# Patient Record
Sex: FEMALE | Race: WHITE | NOT HISPANIC OR LATINO | Employment: FULL TIME | URBAN - NONMETROPOLITAN AREA
[De-identification: names, ages, dates, MRNs, and addresses within clinical notes are randomized per-mention and may not be internally consistent; named-entity substitution may affect disease eponyms.]

---

## 2017-02-01 ENCOUNTER — OFFICE VISIT (OUTPATIENT)
Dept: OBSTETRICS AND GYNECOLOGY | Facility: CLINIC | Age: 22
End: 2017-02-01

## 2017-02-01 VITALS
WEIGHT: 168 LBS | DIASTOLIC BLOOD PRESSURE: 80 MMHG | SYSTOLIC BLOOD PRESSURE: 120 MMHG | HEIGHT: 66 IN | BODY MASS INDEX: 27 KG/M2

## 2017-02-01 DIAGNOSIS — Z30.017 ENCOUNTER FOR INITIAL PRESCRIPTION OF IMPLANTABLE SUBDERMAL CONTRACEPTIVE: Primary | ICD-10-CM

## 2017-02-01 PROCEDURE — 99202 OFFICE O/P NEW SF 15 MIN: CPT | Performed by: NURSE PRACTITIONER

## 2017-02-01 NOTE — PATIENT INSTRUCTIONS
Etonogestrel implant  What is this medicine?  ETONOGESTREL (et oh savita MARIANA trel) is a contraceptive (birth control) device. It is used to prevent pregnancy. It can be used for up to 3 years.  This medicine may be used for other purposes; ask your health care provider or pharmacist if you have questions.  What should I tell my health care provider before I take this medicine?  They need to know if you have any of these conditions:  -abnormal vaginal bleeding  -blood vessel disease or blood clots  -cancer of the breast, cervix, or liver  -depression  -diabetes  -gallbladder disease  -headaches  -heart disease or recent heart attack  -high blood pressure  -high cholesterol  -kidney disease  -liver disease  -renal disease  -seizures  -tobacco smoker  -an unusual or allergic reaction to etonogestrel, other hormones, anesthetics or antiseptics, medicines, foods, dyes, or preservatives  -pregnant or trying to get pregnant  -breast-feeding  How should I use this medicine?  This device is inserted just under the skin on the inner side of your upper arm by a health care professional.  Talk to your pediatrician regarding the use of this medicine in children. Special care may be needed.  Overdosage: If you think you have taken too much of this medicine contact a poison control center or emergency room at once.  NOTE: This medicine is only for you. Do not share this medicine with others.  What if I miss a dose?  This does not apply.  What may interact with this medicine?  Do not take this medicine with any of the following medications:  -amprenavir  -bosentan  -fosamprenavir  This medicine may also interact with the following medications:  -barbiturate medicines for inducing sleep or treating seizures  -certain medicines for fungal infections like ketoconazole and itraconazole  -griseofulvin  -medicines to treat seizures like carbamazepine, felbamate, oxcarbazepine, phenytoin,  topiramate  -modafinil  -phenylbutazone  -rifampin  -some medicines to treat HIV infection like atazanavir, indinavir, lopinavir, nelfinavir, tipranavir, ritonavir  -[place]'s wort  This list may not describe all possible interactions. Give your health care provider a list of all the medicines, herbs, non-prescription drugs, or dietary supplements you use. Also tell them if you smoke, drink alcohol, or use illegal drugs. Some items may interact with your medicine.  What should I watch for while using this medicine?  This product does not protect you against HIV infection (AIDS) or other sexually transmitted diseases.  You should be able to feel the implant by pressing your fingertips over the skin where it was inserted. Contact your doctor if you cannot feel the implant, and use a non-hormonal birth control method (such as condoms) until your doctor confirms that the implant is in place. If you feel that the implant may have broken or become bent while in your arm, contact your healthcare provider.  What side effects may I notice from receiving this medicine?  Side effects that you should report to your doctor or health care professional as soon as possible:  -allergic reactions like skin rash, itching or hives, swelling of the face, lips, or tongue  -breast lumps  -changes in emotions or moods  -depressed mood  -heavy or prolonged menstrual bleeding  -pain, irritation, swelling, or bruising at the insertion site  -scar at site of insertion  -signs of infection at the insertion site such as fever, and skin redness, pain or discharge  -signs of pregnancy  -signs and symptoms of a blood clot such as breathing problems; changes in vision; chest pain; severe, sudden headache; pain, swelling, warmth in the leg; trouble speaking; sudden numbness or weakness of the face, arm or leg  -signs and symptoms of liver injury like dark yellow or brown urine; general ill feeling or flu-like symptoms; light-colored stools; loss of  appetite; nausea; right upper belly pain; unusually weak or tired; yellowing of the eyes or skin  -unusual vaginal bleeding, discharge  -signs and symptoms of a stroke like changes in vision; confusion; trouble speaking or understanding; severe headaches; sudden numbness or weakness of the face, arm or leg; trouble walking; dizziness; loss of balance or coordination  Side effects that usually do not require medical attention (Report these to your doctor or health care professional if they continue or are bothersome.):  -acne  -back pain  -breast pain  -changes in weight  -dizziness  -general ill feeling or flu-like symptoms  -headache  -irregular menstrual bleeding  -nausea  -sore throat  -vaginal irritation or inflammation  This list may not describe all possible side effects. Call your doctor for medical advice about side effects. You may report side effects to FDA at 1-472-FDA-4225.  Where should I keep my medicine?  This drug is given in a hospital or clinic and will not be stored at home.  NOTE: This sheet is a summary. It may not cover all possible information. If you have questions about this medicine, talk to your doctor, pharmacist, or health care provider.     © 2016, Elsevier/Gold Standard. (2015-10-02 14:07:06)

## 2017-02-17 ENCOUNTER — OFFICE VISIT (OUTPATIENT)
Dept: OBSTETRICS AND GYNECOLOGY | Facility: CLINIC | Age: 22
End: 2017-02-17

## 2017-02-17 VITALS
BODY MASS INDEX: 27.64 KG/M2 | WEIGHT: 172 LBS | DIASTOLIC BLOOD PRESSURE: 90 MMHG | HEIGHT: 66 IN | SYSTOLIC BLOOD PRESSURE: 120 MMHG

## 2017-02-17 DIAGNOSIS — Z30.017 NEXPLANON INSERTION: Primary | ICD-10-CM

## 2017-02-17 LAB
B-HCG UR QL: NEGATIVE
INTERNAL NEGATIVE CONTROL: NORMAL
INTERNAL POSITIVE CONTROL: NORMAL
Lab: NORMAL

## 2017-02-17 PROCEDURE — 11981 INSERTION DRUG DLVR IMPLANT: CPT | Performed by: NURSE PRACTITIONER

## 2017-02-17 PROCEDURE — 81025 URINE PREGNANCY TEST: CPT | Performed by: NURSE PRACTITIONER

## 2017-02-17 PROCEDURE — 99212 OFFICE O/P EST SF 10 MIN: CPT | Performed by: NURSE PRACTITIONER

## 2017-02-17 NOTE — PROGRESS NOTES
Subjective   Robina Akins is a 21 y.o. female is here today as a self referral.    HPI Comments: I'm here to get my nexplanon inserted I am 6 weeks pp and breastfeeding    Contraception   Pertinent negatives include no abdominal pain, arthralgias, chest pain, chills, congestion, coughing, diaphoresis, fatigue, fever, headaches, joint swelling, myalgias, nausea, numbness, rash, sore throat, vomiting or weakness.       The following portions of the patient's history were reviewed and updated as appropriate: allergies, current medications, past medical history, past social history, past surgical history and problem list.    Review of Systems   Constitutional: Negative for activity change, appetite change, chills, diaphoresis, fatigue, fever and unexpected weight change.   HENT: Negative for congestion, ear discharge, ear pain, facial swelling, hearing loss, mouth sores, nosebleeds, postnasal drip, rhinorrhea, sinus pressure, sneezing, sore throat, tinnitus, trouble swallowing and voice change.    Eyes: Negative for photophobia, pain, discharge, redness, itching and visual disturbance.   Respiratory: Negative for apnea, cough, choking, chest tightness and shortness of breath.    Cardiovascular: Negative for chest pain, palpitations and leg swelling.   Gastrointestinal: Negative for abdominal distention, abdominal pain, anal bleeding, blood in stool, constipation, diarrhea, nausea, rectal pain and vomiting.   Endocrine: Negative for cold intolerance and heat intolerance.   Genitourinary: Negative for decreased urine volume, difficulty urinating, dyspareunia, flank pain, frequency, genital sores, hematuria, menstrual problem, pelvic pain, urgency, vaginal bleeding, vaginal discharge and vaginal pain.   Musculoskeletal: Negative for arthralgias, back pain, joint swelling and myalgias.   Skin: Negative for color change and rash.   Allergic/Immunologic: Negative for environmental allergies.   Neurological: Negative for  dizziness, syncope, weakness, numbness and headaches.   Hematological: Negative for adenopathy.   Psychiatric/Behavioral: Negative for agitation, confusion and sleep disturbance. The patient is not nervous/anxious.        Objective   Physical Exam   Constitutional: She is oriented to person, place, and time. She appears well-developed and well-nourished.   Eyes: Pupils are equal, round, and reactive to light.   Neck: No thyromegaly present.   Cardiovascular: Normal rate and regular rhythm.    Pulmonary/Chest: Effort normal and breath sounds normal.   Musculoskeletal: Normal range of motion.   Nexplanon inserted into Left arm   Neurological: She is alert and oriented to person, place, and time.   Skin: Skin is warm and dry.   Psychiatric: She has a normal mood and affect. Her behavior is normal. Judgment and thought content normal.   Nursing note and vitals reviewed.  Subdermal Contraceptive Implant Insertion Note    Robina Akins desires a subdermal etonogestrel contraceptive implant insertion.  She has been counseled regarding the risks, benefits and alternatives to the implant.  She especially understands that her menstrual periods are expected to become irregular and unpredictable throughout the time she is using the implant.  She has no contraindications to the insertion.  Her questions have been answered.  She has fully reviewed the FDA-approved consent brochure, has signed the consent form, and wishes to proceed with the insertion today.     Current method of contraception:  no method    No LMP recorded (lmp unknown).    Urine pregnancy test: neg      Procedure Time Out Documentation       Procedure Details  The inner side of the left arm was cleaned with Hibiclens-Alcoholx3 and infiltrated with 2% lidocaine.  The contraceptive janes was inserted according to the 's instructions without complications.  The janes was palpable under the skin after the insertion.  The insertion site was closed with Band-Aid  and a pressure dressing was applied.    Lot:  [id number]  Exp:  5-19      Robina was given post-insertion instructions.  She understands that the implant must be removed at the end of three years and may be removed sooner if she wishes.    Successful insertion of implanon device.        Assessment/Plan   Robina was seen today for contraception.    Diagnoses and all orders for this visit:    Nexplanon insertion    This is patient's 3rd Nexplanon. She is 6 weeks pp and breastfeeding. No problems with insertion  -     POC Pregnancy, Urine

## 2017-02-17 NOTE — PATIENT INSTRUCTIONS
Etonogestrel implant  What is this medicine?  ETONOGESTREL (et oh savita MARIANA trel) is a contraceptive (birth control) device. It is used to prevent pregnancy. It can be used for up to 3 years.  This medicine may be used for other purposes; ask your health care provider or pharmacist if you have questions.  COMMON BRAND NAME(S): Implanon, Nexplanon  What should I tell my health care provider before I take this medicine?  They need to know if you have any of these conditions:  -abnormal vaginal bleeding  -blood vessel disease or blood clots  -cancer of the breast, cervix, or liver  -depression  -diabetes  -gallbladder disease  -headaches  -heart disease or recent heart attack  -high blood pressure  -high cholesterol  -kidney disease  -liver disease  -renal disease  -seizures  -tobacco smoker  -an unusual or allergic reaction to etonogestrel, other hormones, anesthetics or antiseptics, medicines, foods, dyes, or preservatives  -pregnant or trying to get pregnant  -breast-feeding  How should I use this medicine?  This device is inserted just under the skin on the inner side of your upper arm by a health care professional.  Talk to your pediatrician regarding the use of this medicine in children. Special care may be needed.  Overdosage: If you think you have taken too much of this medicine contact a poison control center or emergency room at once.  NOTE: This medicine is only for you. Do not share this medicine with others.  What if I miss a dose?  This does not apply.  What may interact with this medicine?  Do not take this medicine with any of the following medications:  -amprenavir  -bosentan  -fosamprenavir  This medicine may also interact with the following medications:  -barbiturate medicines for inducing sleep or treating seizures  -certain medicines for fungal infections like ketoconazole and itraconazole  -griseofulvin  -medicines to treat seizures like carbamazepine, felbamate, oxcarbazepine, phenytoin,  topiramate  -modafinil  -phenylbutazone  -rifampin  -some medicines to treat HIV infection like atazanavir, indinavir, lopinavir, nelfinavir, tipranavir, ritonavir  -[place]'s wort  This list may not describe all possible interactions. Give your health care provider a list of all the medicines, herbs, non-prescription drugs, or dietary supplements you use. Also tell them if you smoke, drink alcohol, or use illegal drugs. Some items may interact with your medicine.  What should I watch for while using this medicine?  This product does not protect you against HIV infection (AIDS) or other sexually transmitted diseases.  You should be able to feel the implant by pressing your fingertips over the skin where it was inserted. Contact your doctor if you cannot feel the implant, and use a non-hormonal birth control method (such as condoms) until your doctor confirms that the implant is in place. If you feel that the implant may have broken or become bent while in your arm, contact your healthcare provider.  What side effects may I notice from receiving this medicine?  Side effects that you should report to your doctor or health care professional as soon as possible:  -allergic reactions like skin rash, itching or hives, swelling of the face, lips, or tongue  -breast lumps  -changes in emotions or moods  -depressed mood  -heavy or prolonged menstrual bleeding  -pain, irritation, swelling, or bruising at the insertion site  -scar at site of insertion  -signs of infection at the insertion site such as fever, and skin redness, pain or discharge  -signs of pregnancy  -signs and symptoms of a blood clot such as breathing problems; changes in vision; chest pain; severe, sudden headache; pain, swelling, warmth in the leg; trouble speaking; sudden numbness or weakness of the face, arm or leg  -signs and symptoms of liver injury like dark yellow or brown urine; general ill feeling or flu-like symptoms; light-colored stools; loss of  appetite; nausea; right upper belly pain; unusually weak or tired; yellowing of the eyes or skin  -unusual vaginal bleeding, discharge  -signs and symptoms of a stroke like changes in vision; confusion; trouble speaking or understanding; severe headaches; sudden numbness or weakness of the face, arm or leg; trouble walking; dizziness; loss of balance or coordination  Side effects that usually do not require medical attention (Report these to your doctor or health care professional if they continue or are bothersome.):  -acne  -back pain  -breast pain  -changes in weight  -dizziness  -general ill feeling or flu-like symptoms  -headache  -irregular menstrual bleeding  -nausea  -sore throat  -vaginal irritation or inflammation  This list may not describe all possible side effects. Call your doctor for medical advice about side effects. You may report side effects to FDA at 7-398-FDA-4684.  Where should I keep my medicine?  This drug is given in a hospital or clinic and will not be stored at home.  NOTE: This sheet is a summary. It may not cover all possible information. If you have questions about this medicine, talk to your doctor, pharmacist, or health care provider.     © 2016, [employer]vier/Gold Standard. (2015-10-02 14:07:06)

## 2019-08-01 ENCOUNTER — OFFICE VISIT (OUTPATIENT)
Dept: OBSTETRICS AND GYNECOLOGY | Facility: CLINIC | Age: 24
End: 2019-08-01

## 2019-08-01 VITALS
HEIGHT: 67 IN | BODY MASS INDEX: 22.91 KG/M2 | WEIGHT: 146 LBS | SYSTOLIC BLOOD PRESSURE: 122 MMHG | DIASTOLIC BLOOD PRESSURE: 70 MMHG

## 2019-08-01 DIAGNOSIS — Z30.017 ENCOUNTER FOR INITIAL PRESCRIPTION OF NEXPLANON: Primary | ICD-10-CM

## 2019-08-01 PROCEDURE — 99213 OFFICE O/P EST LOW 20 MIN: CPT | Performed by: OBSTETRICS & GYNECOLOGY

## 2019-08-01 NOTE — PROGRESS NOTES
Subjective   Robina Akins is a 23 y.o. female  YOB: 1995    Chief Complaint   Patient presents with   • Contraception     Pt is here to have her nexplanon removed.  PT is also wanting to discuss other birth control options.         24 yo  Patient's last menstrual period was 2019 (approximate) presents for contraceptive management. Patient currently has Nexplanon in place at this time. Reports that over the past couple of months she has had irregular bleeding. Her Nexplanon was placed in 2017. She reports for approximately 1.5 years she had no period. Her last PAP smear was 3 years ago and was negative as per patient. She reports that she is sexually active with one partner at this time. Denies smoking drinking or drug use.               No Known Allergies    History reviewed. No pertinent past medical history.    Family History   Problem Relation Age of Onset   • Breast cancer Maternal Grandmother    • Colon cancer Maternal Aunt        Social History     Socioeconomic History   • Marital status: Single     Spouse name: Not on file   • Number of children: Not on file   • Years of education: Not on file   • Highest education level: Not on file   Tobacco Use   • Smoking status: Former Smoker     Last attempt to quit: 2012     Years since quittin.5   • Smokeless tobacco: Never Used   Substance and Sexual Activity   • Alcohol use: No   • Drug use: No   • Sexual activity: Yes     Partners: Male     Birth control/protection: Implant     Comment: csp for 8 years         Current Outpatient Medications:   •  Etonogestrel (NEXPLANON) 68 MG implant subdermal implant, Inject 1 each into the appropriate area of the skin as directed by provider 1 (One) Time., Disp: , Rfl:   •  Prenatal Vit-Fe Fumarate-FA (PRENATAL VITAMIN PO), Take  by mouth., Disp: , Rfl:     Patient's last menstrual period was 2019 (approximate).    Sexual History:         Could not be calculated    Past Surgical  "History:   Procedure Laterality Date   • ADENOIDECTOMY     • DILATATION AND CURETTAGE     • EAR TUBES     • WRIST SURGERY Right        Review of Systems   Genitourinary: Positive for menstrual problem and vaginal bleeding. Negative for vaginal discharge.       Objective   Physical Exam   Constitutional: She is oriented to person, place, and time. She appears well-developed and well-nourished.   HENT:   Head: Normocephalic and atraumatic.   Neck: Normal range of motion.   Pulmonary/Chest: Effort normal.   Musculoskeletal: Normal range of motion.   Neurological: She is alert and oriented to person, place, and time.   Skin: Skin is warm and dry.   Nexplanon palpable in left upper arm.    Psychiatric: She has a normal mood and affect. Her behavior is normal. Judgment normal.   Nursing note and vitals reviewed.    Vitals:    08/01/19 1329   BP: 122/70   Weight: 66.2 kg (146 lb)   Height: 170.2 cm (67\")       Robina was seen today for contraception.    Diagnoses and all orders for this visit:    Encounter for initial prescription of Nexplanon    -Patient to return to office for annual examination as well as Nexplanon removal and insertion.   -Discussed different management options at this time for contraception. Reports that she is interested in another Nexplanon   -Patient needs PAP smear at next visit   -Counseled patient about safe sexual practices.     Celsa Berg, DO       "

## 2019-08-27 ENCOUNTER — TELEPHONE (OUTPATIENT)
Dept: OBSTETRICS AND GYNECOLOGY | Facility: CLINIC | Age: 24
End: 2019-08-27

## 2023-03-07 ENCOUNTER — TELEPHONE (OUTPATIENT)
Dept: OTOLARYNGOLOGY | Facility: CLINIC | Age: 28
End: 2023-03-07
Payer: MEDICAID

## 2023-03-07 NOTE — TELEPHONE ENCOUNTER
Call placed to patient as March appointment must be rescheduled, message left informing of new appointment on 4/242023 at 110, appointment reminder mailed as well

## 2023-04-24 ENCOUNTER — OFFICE VISIT (OUTPATIENT)
Dept: OTOLARYNGOLOGY | Facility: CLINIC | Age: 28
End: 2023-04-24
Payer: MEDICAID

## 2023-04-24 VITALS
WEIGHT: 177.8 LBS | DIASTOLIC BLOOD PRESSURE: 78 MMHG | TEMPERATURE: 97.5 F | BODY MASS INDEX: 30.35 KG/M2 | SYSTOLIC BLOOD PRESSURE: 131 MMHG | HEART RATE: 71 BPM | HEIGHT: 64 IN

## 2023-04-24 DIAGNOSIS — J35.03 TONSILLITIS AND ADENOIDITIS, CHRONIC: Primary | ICD-10-CM

## 2023-04-24 PROBLEM — E55.9 VITAMIN D DEFICIENCY: Status: ACTIVE | Noted: 2020-06-26

## 2023-04-24 PROCEDURE — 99204 OFFICE O/P NEW MOD 45 MIN: CPT | Performed by: EMERGENCY MEDICINE

## 2023-04-24 PROCEDURE — 1159F MED LIST DOCD IN RCRD: CPT | Performed by: EMERGENCY MEDICINE

## 2023-04-24 PROCEDURE — 1160F RVW MEDS BY RX/DR IN RCRD: CPT | Performed by: EMERGENCY MEDICINE

## 2023-04-24 RX ORDER — MULTIPLE VITAMINS W/ MINERALS TAB 9MG-400MCG
1 TAB ORAL DAILY
COMMUNITY

## 2023-04-24 NOTE — PROGRESS NOTES
CHARLY Mendenhall  [facility]  [address], [address]  [place] [address]  Fax [phone]  Phone [phone]      Visit Type: NEW PATIENT   Chief Complaint   Patient presents with   • Sore Throat     Recurrent strep, tonsillitis        HPI  She complains of tonsil problems, sore throats, frequent tonsillitis and large tonsils. The symptoms are not localized to a particular location. The patient has had mild to moderate symptoms. The symptoms have been relatively constant for the last several years. There have been no identified factors that aggravate the symptoms. The patient has been treated with Cefdinir in the past with continued symptoms.  She reports seeing an ENT in [place] several years ago.  She is a former smoker.  She reports near daily sore throats.  She states her tonsils and throat have been swollen and enlarged for years.     History reviewed. No pertinent past medical history.    Past Surgical History:   Procedure Laterality Date   • ADENOIDECTOMY     • DILATATION AND CURETTAGE     • EAR TUBES     • WRIST SURGERY Right        Family History: Her family history includes Breast cancer in her maternal grandmother; Colon cancer in her maternal aunt.     Social History: She  reports that she quit smoking about 11 years ago. Her smoking use included cigarettes. She has never used smokeless tobacco. She reports that she does not drink alcohol and does not use drugs.    Home Medications:  multivitamin with minerals    Allergies:  She has No Known Allergies.       Vital Signs:   Temp:  [97.5 °F (36.4 °C)] 97.5 °F (36.4 °C)  Heart Rate:  [71] 71  BP: (131)/(78) 131/78  ENT Physical Exam  Constitutional  Appearance: patient appears well-developed, well-nourished and well-groomed,  Communication/Voice: communication appropriate for developmental age; vocal quality normal;  Head and Face  Appearance: head appears normal, face  appears normal and face appears atraumatic;  Palpation: facial palpation normal;  Salivary: glands normal;  Ear  Hearing: intact;  Auricles: right auricle normal; left auricle normal;  External Mastoids: right external mastoid normal; left external mastoid normal;  Ear Canals: right ear canal normal; left ear canal normal;  Tympanic Membranes: left tympanic membrane abnormal; with myringosclerosis;  Nose  Internal Nose: bilateral intranasal mucosa edematous and erythematous;  Oral Cavity/Oropharynx  Lips: normal;  Teeth: normal;  Gums: gingiva normal;  Tongue: normal;  Oral mucosa: normal;  Hard palate: normal;  Tonsils: bilateral tonsils 1+, cryptic;  Neck  Neck: neck normal; neck palpation normal;  Respiratory  Inspection: breathing unlabored;  Cardiovascular  Inspection: extremities are warm and well perfused;  Lymphatic  Palpation: lymph nodes normal;         Result Review    RESULTS REVIEW    I have reviewed the patients old records in the chart.     Assessment & Plan    Diagnoses and all orders for this visit:    1. Tonsillitis and adenoiditis, chronic (Primary)           No follow-ups on file.      CHARLY Mendenhall   04/24/23  11:10 CDT     Physician Attestation  I have seen and examined Robina Babin and have reviewed the notes, assessments, and/or procedures and I concur with this documentation.    Robina Babin is a 27 y.o. female presents for evaluation of tonsil problems. She has had a history of chronic tonsillar infammation (>3mo) unresponsive to medical managment.     OROPHARYNX: mucosa normal, tonsil fossa normal, 2+             ASSESSMENT:  1. Tonsillitis and adenoiditis, chronic         PLAN:  Medical and surgical options were discussed including observation versus surgical management. Risks, benefits and alternatives were discussed and questions were answered. After considering the options, it was decided that tonsillectomy andadenoidectomy was the best option.    INFORMED CONSENT  DISCUSSION:  TONSILLECTOMY AND ADENOIDECTOMY: A tonsillectomy and adenoidectomy were recommended. The risks and benefits were explained including but not limited to early and late bleeding, infection, risks of the general anesthesia, dysphagia and poor PO intake, and voice change/VPI.  Alternatives were discussed. The patient/parents understood these risks and wanted to proceed. Questions were asked appropriately answered.   .      PREOPERATIVE WORKUP:   Per anesthesia    Return for follow up postoperatively.        Electronically signed by Tyson Lerma MD, 04/24/23, 11:32 AM CDT.

## 2023-04-26 PROBLEM — J35.03 TONSILLITIS AND ADENOIDITIS, CHRONIC: Status: ACTIVE | Noted: 2023-04-26

## 2023-06-29 PROBLEM — Z90.89 S/P TONSILLECTOMY: Status: ACTIVE | Noted: 2023-06-29

## 2023-06-29 PROBLEM — J35.03 TONSILLITIS AND ADENOIDITIS, CHRONIC: Status: RESOLVED | Noted: 2023-04-26 | Resolved: 2023-06-29

## 2023-07-06 ENCOUNTER — TELEPHONE (OUTPATIENT)
Dept: OTOLARYNGOLOGY | Facility: CLINIC | Age: 28
End: 2023-07-06

## 2023-07-06 DIAGNOSIS — Z90.89 S/P TONSILLECTOMY: ICD-10-CM

## 2023-07-06 RX ORDER — OXYCODONE HCL 5 MG/5 ML
4 SOLUTION, ORAL ORAL EVERY 4 HOURS PRN
Qty: 90 ML | Refills: 0 | Status: CANCELLED | OUTPATIENT
Start: 2023-07-06

## 2023-07-06 NOTE — TELEPHONE ENCOUNTER
UNABLE TO WARM [place]    Provider: DR OLIVA  Caller: TRISH ALICIA  Relationship to Patient: SELF  Pharmacy: [facility]  Phone Number: [phone]  Reason for Call: PT HAD TONSILLECTOMY ON 6/29/23, PT HAS A FEVER THAT IS SLOWLY RISING DAY TO DAY,HER FACE IS SPLOTCHY, AND SHE HAS A BAD TASTE IN HER MOUTH.    PT HAS ALSO REQUESTED FOR PAIN MED TO BE CALLED INTO THE [facility], SHE IS NOT ABLE TO GET TO  PHARM TO  MED.    PLEASE CALL TO DISCUSS.

## 2024-05-30 ENCOUNTER — TELEPHONE (OUTPATIENT)
Dept: OTOLARYNGOLOGY | Facility: CLINIC | Age: 29
End: 2024-05-30
Payer: MEDICAID

## 2024-05-30 NOTE — TELEPHONE ENCOUNTER
pt called in complaining of swollen throat, and left ear pain scheduled with [facility] for tomorrow as a 1 yr follow from [facility] surgery.

## 2024-05-31 ENCOUNTER — OFFICE VISIT (OUTPATIENT)
Dept: OTOLARYNGOLOGY | Facility: CLINIC | Age: 29
End: 2024-05-31
Payer: MEDICAID

## 2024-05-31 VITALS — HEIGHT: 65 IN | BODY MASS INDEX: 29.32 KG/M2 | WEIGHT: 176 LBS | TEMPERATURE: 98.4 F

## 2024-05-31 DIAGNOSIS — H74.42 GRANULATION POLYP OF LEFT MIDDLE EAR: Primary | ICD-10-CM

## 2024-05-31 RX ORDER — CIPROFLOXACIN AND DEXAMETHASONE 3; 1 MG/ML; MG/ML
3 SUSPENSION/ DROPS AURICULAR (OTIC) 2 TIMES DAILY
Qty: 7.5 ML | Refills: 0 | Status: SHIPPED | OUTPATIENT
Start: 2024-05-31

## 2024-05-31 NOTE — PROGRESS NOTES
"    CHARLY Lopez  [facility]  [address], [address]  [place] [address]  Fax [phone]  Phone [phone]      Visit Type: FOLLOW UP   Chief Complaint   Patient presents with    Ear Fullness     L ear     Itching     L ear    Oral Swelling     \"Glands\" per pt           HPI  She presents for a follow up evaluation. She has had complaints of otalgia, ear fullness, ear pressure, and ear itching,  enlarged lymph nodes . The symptoms are localized to the left side. The symptoms severity was described as : moderate The symptoms have been : relatively constant for the last several weeks There have been no identified factors that aggravate the symptoms. There have been no factors that have improved the symptoms.      History reviewed. No pertinent past medical history.    Past Surgical History:   Procedure Laterality Date    ADENOIDECTOMY      DILATATION AND CURETTAGE      EAR TUBES      TONSILLECTOMY AND ADENOIDECTOMY Bilateral 6/29/2023    Procedure: tonsillectomy with coblation;  Surgeon: Tyson Lerma MD;  Location: [facility];  Service: ENT;  Laterality: Bilateral;    TYMPANOPLASTY Left     WRIST SURGERY Right        Family History: Her family history includes Breast cancer in her maternal grandmother; Colon cancer in her maternal aunt.     Social History: She  reports that she quit smoking about 12 years ago. Her smoking use included cigarettes. She has never used smokeless tobacco. She reports current alcohol use. She reports that she does not use drugs.    Home Medications:  ciprofloxacin-dexAMETHasone    Allergies:  She has No Known Allergies.       Vital Signs:   Temp:  [98.4 °F (36.9 °C)] 98.4 °F (36.9 °C)  ENT Physical Exam  Constitutional  Appearance: patient appears well-developed, well-nourished and well-groomed,  Communication/Voice: communication appropriate for developmental age; vocal quality " normal;  Head and Face  Appearance: head appears normal, face appears normal and face appears atraumatic;  Palpation: facial palpation normal;  Salivary: glands normal;  Ear  Hearing: intact;  Auricles: right auricle normal; left auricle normal;  External Mastoids: right external mastoid normal; left external mastoid normal;  Ear Canals: right ear canal normal; left ear canal normal;  Tympanic Membranes: left tympanic membrane abnormal; with granulation tissue;  Nose  External Nose: nares patent bilaterally; external nose normal;  Oral Cavity/Oropharynx  Lips: normal;  Teeth: normal;  Gums: gingiva normal;  Tongue: normal;  Oral mucosa: normal;  Hard palate: normal;  Tonsils: bilateral tonsils absent,  Neck  Neck: neck normal; neck palpation normal;  Respiratory  Inspection: breathing unlabored;  Cardiovascular  Inspection: extremities are warm and well perfused;  Lymphatic  Palpation: no cervical adenopathy noted;       Tympanometry    Date/Time: 5/31/2024 9:48 AM    Performed by: Marixa Hernandes APRN  Authorized by: Marixa Hernandes APRN  Consent: Verbal consent obtained.  Consent given by: patient  Patient tolerance: patient tolerated the procedure well with no immediate complications  Comments: Type A bilaterally         Result Review       RESULTS REVIEW    I have reviewed the patients old records in the chart.        Assessment & Plan  Granulation polyp of left middle ear       Orders Placed This Encounter   Procedures    Tympanometry      New Medications Ordered This Visit   Medications    ciprofloxacin-dexAMETHasone (CIPRODEX) 0.3-0.1 % otic suspension     Sig: Administer 3 drops into ear(s) as directed by provider 2 (Two) Times a Day.     Dispense:  7.5 mL     Refill:  0     Protect getting water in the ears. If needed, may use over the counter silicone plugs or a cotton ball coated with vasoline when bathing.  Use hairdryer on a cool setting after bathing.  For proper use of ear drops, push on  tragus (cartilage in front of ear canal) after drop placement.  Return in about 6 weeks (around 7/12/2024) for Follow up with CHARLY Lopez, with audiogram.        Electronically signed by CHARLY Lopez 05/31/24 9:48 AM CDT.

## 2024-06-03 ENCOUNTER — TELEPHONE (OUTPATIENT)
Dept: OTOLARYNGOLOGY | Facility: CLINIC | Age: 29
End: 2024-06-03
Payer: MEDICAID

## 2024-06-03 NOTE — TELEPHONE ENCOUNTER
----- Message from Odalis FRANKLIN sent at 6/3/2024  8:26 AM CDT -----  Regarding: FW: Neurological symptoms  Contact: [phone]    ----- Message -----  From: Robina Babin  Sent: 5/31/2024   7:54 PM CDT  To: [facility]  Subject: Neurological symptoms                            I am feeling confused and like I am going to fall asleep driving among other things. I have a spot in my vision on left side as well and have had stinky stuff coming out of this ear for years. I am worried that these symptoms could all be connected and would really love to have a CT scan to rule out anything serious. Can this be ordered through you guys?

## 2024-06-03 NOTE — TELEPHONE ENCOUNTER
Called pt and informed her that I had the provider look at her message. Informed her that, per APRN, she would need to be seen in the ER because the symptoms are not caused from a granulation polyp. Pt KIMBERLY.

## 2024-06-27 ENCOUNTER — TELEPHONE (OUTPATIENT)
Dept: OTOLARYNGOLOGY | Facility: CLINIC | Age: 29
End: 2024-06-27
Payer: MEDICAID

## 2024-06-27 NOTE — TELEPHONE ENCOUNTER
Called pt to r/s appt due to audiologist not here. Pt had already rescheduled her appt due to schedule conflict. Pt was r/s with same audiologist in October. Audiologist no longer works here so I rescheduled her to September with a different audiologist. Pt confirmed appt date/time.

## 2024-07-08 NOTE — PROGRESS NOTES
----- Message from Holly Ford sent at 7/8/2024  9:40 AM CDT -----  Regarding: Refill  MEDICATION REFILL REQUEST:     Call urgency: routine    Patient name: Nanci     Patient phone number: [phone]    Requested Medication: Adderall     Has Pt contacted Pharmacy : yes    Preferred Pharmacy:[facility]    Last completed appt date 05/15    Next appt date:  07/29    Holly Ford  7/8/2024, 9:41 AM   Subjective   Robina Akins is a 21 y.o. female is here today as a self referral.    HPI Comments: I'm here to see about getting the Nexplanon. I am 4 weeks pp and breastfeeding. I have had the Nexplanon before.    Contraception   Pertinent negatives include no abdominal pain, arthralgias, chest pain, chills, congestion, coughing, fatigue, headaches, myalgias, nausea, neck pain, numbness, rash, sore throat or vomiting.       The following portions of the patient's history were reviewed and updated as appropriate: allergies, current medications, past family history, past medical history, past surgical history and problem list.    Review of Systems   Constitutional: Negative for activity change, appetite change, chills and fatigue.   HENT: Negative for congestion, dental problem, ear pain, hearing loss, nosebleeds, rhinorrhea, sneezing, sore throat and voice change.    Eyes: Negative for discharge, redness, itching and visual disturbance.   Respiratory: Negative for apnea, cough, choking, shortness of breath and wheezing.    Cardiovascular: Negative for chest pain and palpitations.   Gastrointestinal: Negative for abdominal distention, abdominal pain, constipation, diarrhea, nausea and vomiting.   Endocrine: Negative for cold intolerance, heat intolerance and polyuria.   Genitourinary: Negative for difficulty urinating, dyspareunia, flank pain, genital sores, menstrual problem, pelvic pain, vaginal bleeding and vaginal discharge.   Musculoskeletal: Negative for arthralgias, back pain, myalgias and neck pain.   Skin: Negative for pallor and rash.   Allergic/Immunologic: Negative for environmental allergies and food allergies.   Neurological: Negative for dizziness, tremors, seizures, numbness and headaches.   Hematological: Negative for adenopathy. Does not bruise/bleed easily.   Psychiatric/Behavioral: Negative for agitation, decreased concentration, sleep disturbance and suicidal ideas. The patient is not  nervous/anxious.        Objective   Physical Exam   Constitutional: She is oriented to person, place, and time. She appears well-developed and well-nourished.   Eyes: Pupils are equal, round, and reactive to light.   Neck: No thyromegaly present.   Cardiovascular: Normal rate and regular rhythm.    Pulmonary/Chest: Effort normal and breath sounds normal.   Musculoskeletal: Normal range of motion.   Neurological: She is alert and oriented to person, place, and time.   Skin: Skin is warm and dry.   Psychiatric: She has a normal mood and affect. Her behavior is normal. Judgment and thought content normal.   Nursing note and vitals reviewed.        Assessment/Plan   Robina was seen today for contraception.    Diagnoses and all orders for this visit:    Encounter for initial prescription of implantable subdermal contraceptive    I am 4 weeks pp and want to get the Nexplanon. I am breastfeeding and know I can still do that and get the Nexplanon inserted. I have had  The Nexplanon 2 other times. I had a physical and pap while pregnant about 8 months ago.

## 2024-09-05 ENCOUNTER — TELEPHONE (OUTPATIENT)
Dept: OTOLARYNGOLOGY | Facility: CLINIC | Age: 29
End: 2024-09-05
Payer: MEDICAID

## 2024-09-09 ENCOUNTER — PROCEDURE VISIT (OUTPATIENT)
Dept: OTOLARYNGOLOGY | Facility: CLINIC | Age: 29
End: 2024-09-09
Payer: MEDICAID

## 2024-09-09 ENCOUNTER — OFFICE VISIT (OUTPATIENT)
Dept: OTOLARYNGOLOGY | Facility: CLINIC | Age: 29
End: 2024-09-09
Payer: MEDICAID

## 2024-09-09 VITALS
TEMPERATURE: 98.4 F | DIASTOLIC BLOOD PRESSURE: 82 MMHG | HEART RATE: 69 BPM | BODY MASS INDEX: 30.32 KG/M2 | SYSTOLIC BLOOD PRESSURE: 160 MMHG | HEIGHT: 65 IN | WEIGHT: 182 LBS

## 2024-09-09 DIAGNOSIS — Z01.10 HEARING WITHIN NORMAL LIMITS IN BOTH EARS: ICD-10-CM

## 2024-09-09 DIAGNOSIS — H74.42 GRANULATION POLYP OF LEFT MIDDLE EAR: Primary | ICD-10-CM

## 2024-09-09 DIAGNOSIS — Z01.10 HEARING WITHIN NORMAL LIMITS IN BOTH EARS: Primary | ICD-10-CM

## 2024-09-09 DIAGNOSIS — K21.9 LARYNGOPHARYNGEAL REFLUX (LPR): ICD-10-CM

## 2024-09-09 PROCEDURE — 99213 OFFICE O/P EST LOW 20 MIN: CPT | Performed by: NURSE PRACTITIONER

## 2024-09-09 PROCEDURE — 1159F MED LIST DOCD IN RCRD: CPT | Performed by: NURSE PRACTITIONER

## 2024-09-09 PROCEDURE — 92557 COMPREHENSIVE HEARING TEST: CPT

## 2024-09-09 PROCEDURE — 1160F RVW MEDS BY RX/DR IN RCRD: CPT | Performed by: NURSE PRACTITIONER

## 2024-09-09 PROCEDURE — 92567 TYMPANOMETRY: CPT

## 2024-09-09 RX ORDER — NICOTINE POLACRILEX 4 MG/1
20 GUM, CHEWING ORAL 2 TIMES DAILY
Qty: 60 EACH | Refills: 3 | Status: SHIPPED | OUTPATIENT
Start: 2024-09-09 | End: 2024-10-09

## 2024-09-09 NOTE — PROGRESS NOTES
AUDIOMETRIC EVALUATION      Name:  Robina Babin  :  1995  Age:  28 y.o.  Date of Evaluation:  2024       History:  Ms. Babin is seen today for a hearing evaluation due to polyp in left middle ear at the request of CHARLY Lopez.    Audiologic Information:  Concerns for Hearing: Left ear  PETs: Bilateral history  Other otologic surgical history: 2008-earl tympanoplasty  Aural Pressure/Fullness: No  Otalgia: No, but a deep itchy feeling in left ear  Otorrhea: No  Tinnitus: Occasional ringing  Dizziness: Occasional lightheaded/off balance feeling  Noise Exposure: No  Family history of hearing loss: No  Head trauma requiring hospital stay: No  Chemotherapy: No  Other significant history: No    **Case history obtained in office and through EMR system      EVALUATION:        RESULTS:    Otoscopic Evaluation:  Right: clear canal, tympanic membrane visualized  Left: clear canal, tympanic membrane visualized    Tympanometry (226 Hz):  Right: Type A  Left: Type A    Pure Tone Audiometry:    Right: Normal hearing thresholds   Left: Normal hearing thresholds     Speech Audiometry:   Right: Speech Reception Threshold (SRT) was obtained at 5 dB HL  Word Recognition scores - Excellent (100)% at 45 dB, using NU-6 List 1A with 10 words  Left: Speech Reception Threshold (SRT) was obtained at 5 dB HL  Word Recognition scores - Excellent (100)% at 45 dB, using NU-6 List 1A with 10 words  SRT/PTA in good agreement bilaterally.    IMPRESSIONS:  Tympanometry showed normal middle ear pressure and static compliance, consistent with normal middle ear function for both ears. Pure tone thresholds for both ears show normal, suggesting normal outer/middle ear function and normal cochlear/retrocochlear function. Patient was counseled with regard to the findings.      Diagnosis:  1. Hearing within normal limits in both ears         RECOMMENDATIONS/PLAN:  Follow-up recommendations per Donnell Taylor  APRN.  Practice good communication strategies to assist with everyday listening (eye contact with speakers, reduce background noise, encourage others to communicate clearly and slowly).  Consistent utilization of hearing protection devices in noisy environments.  Repeat hearing evaluation if changes in hearing are noted or concerns arise.  Discussed results and recommendations with patient. Questions were addressed and the patient was encouraged to contact our department should concerns arise.        Maritza Muhammad, CCC-A, F-AAA  Doctor of Audiology

## 2024-09-09 NOTE — PATIENT INSTRUCTIONS
Gastroesophageal Reflux Disease (Laryngopharyngeal Reflux), Adult  Gastroesophageal reflux disease (GERD) and/or Laryngopharyngeal Reflux, (LPR) happens when acid from your stomach flows up into the esophagus and/or throat and voicebox or larynx. When acid comes in contact with the these organs, the acid can cause soreness (inflammation). Over time, GERD may create small holes (ulcers) in the lining of the esophagus and may lead to the development of hoarseness, difficulty swallowing,   feeling of something stuck in the throat, increased mucous or drainage and even predispose to the development of malignancies, (cancer).    CAUSES   Increased body weight. This puts pressure on the stomach, making acid rise from the stomach into the esophagus.  Smoking. This increases acid production in the stomach.  Drinking alcohol. This causes decreased pressure in the lower esophageal sphincter (valve or ring of muscle between the esophagus and stomach), allowing acid from the stomach into the esophagus.  Late evening meals and a full stomach. This increases pressure and acid production in the stomach.  A malformed lower esophageal sphincter  Diet which can include avoidance of gluten and dairy products  Age  SYMPTOMS   Burning pain in the lower part of the mid-chest behind the breastbone and in the mid-stomach area. This may occur twice a week or more often.  Trouble swallowing.  Sore throat.  Dry cough.  Asthma-like symptoms including chest tightness, shortness of breath, or wheezing.  Globus sensation-something stuck in the throat/fullness  Hoarseness  DIAGNOSIS   Your caregiver may be able to diagnose GERD based on your symptoms. In some cases, X-rays and other tests may be done to check for complications or to check the condition of your stomach and esophagus.  You may need to see another doctor.  TREATMENT   Over-the-counter or prescription medicines to help decrease acid production.   Dietary and behavioral modifications  or changes may be also recommended.  HOME CARE INSTRUCTIONS   Change the factors that you can control. Ask your caregiver for guidance concerning weight loss, quitting smoking, and alcohol consumption.  Avoid foods and drinks that make your symptoms worse, and MAY include such as:  Caffeine or alcoholic drinks.  Chocolate.  Gluten containing foods  Dairy  Peppermint or mint flavorings.  Garlic and onions.  Spicy foods.  Citrus fruits, such as oranges, neva, or limes.  Tomato-based foods such as sauce, chili, salsa, and pizza.  Fried and fatty foods.  Avoid lying down for the 3 hours prior to your bedtime or prior to taking a nap.  Eat small, frequent meals instead of large meals.  Wear loose-fitting clothing. Do not wear anything tight around your waist that causes pressure on your stomach.  Raise the head of your bed 6 to 8 inches with wood blocks to help you sleep. Extra pillows will not help.  Only take over-the-counter or prescription medicines for pain, discomfort, or fever as directed by your caregiver.  Do not take aspirin, ibuprofen, or other nonsteroidal anti-inflammatory drugs if possible (NSAIDs).  SEEK IMMEDIATE MEDICAL CARE IF:   You have pain in your arms, neck, jaw, teeth, or back.  Your pain increases or changes in intensity or duration.  You develop nausea, vomiting, or sweating (diaphoresis).  You develop shortness of breath, or you faint.  Your vomit is green, yellow, black, or looks like coffee grounds or blood.  Your stool is red, bloody, or black.  These symptoms could be signs of other problems, such as heart disease, gastric bleeding, or esophageal bleeding.  MAKE SURE YOU:   Understand these instructions.  Will watch your condition.  Will get help right away if you are not doing well or get worse.     This information is not intended to replace advice given to you by your physician. Make sure you discuss any questions you have with your health care provider.     Modified by Renard Jiang,  MD, FACS 9/8/2016.  Document Released: 09/27/2006 Document Revised: 01/08/2016 Document Reviewed: 04/13/2016  [employer] Interactive Patient Education ©2016 [employer]

## 2024-09-09 NOTE — PROGRESS NOTES
YOB: 1995  Location: [facility]  Location Address: [address] [place] [address],  3, [address] [place] [address]  Location Phone: [phone]    Chief Complaint   Patient presents with    Granulation polyp left ear       History of Present Illness  Robina Babin is a 28 y.o. female.  Robina Babin is here for follow up of ENT complaints. The patient has had problems with manage in follow-up of the left ear.  She has completed eardrops.  She denies ear pain, ear drainage, and concerns for hearing.  Hearing test today is normal.  She does admit intermittent sore throat.  She does admit heartburn and indigestion.  She is not currently taking anything for acid reflux.    She has a history of tonsillectomy.    Reviewed:       AUDIOMETRIC EVALUATION        Name:  Robina Babin  :  1995  Age:  28 y.o.  Date of Evaluation:  2024         History:  Ms. Babin is seen today for a hearing evaluation due to polyp in left middle ear at the request of CHARLY Lopez.     Audiologic Information:  Concerns for Hearing: Left ear  PETs: Bilateral history  Other otologic surgical history: - tympanoplasty  Aural Pressure/Fullness: No  Otalgia: No, but a deep itchy feeling in left ear  Otorrhea: No  Tinnitus: Occasional ringing  Dizziness: Occasional lightheaded/off balance feeling  Noise Exposure: No  Family history of hearing loss: No  Head trauma requiring hospital stay: No  Chemotherapy: No  Other significant history: No     **Case history obtained in office and through EMR system        EVALUATION:          RESULTS:     Otoscopic Evaluation:  Right: clear canal, tympanic membrane visualized  Left: clear canal, tympanic membrane visualized     Tympanometry (226 Hz):  Right: Type A  Left: Type A     Pure Tone Audiometry:    Right: Normal hearing thresholds   Left: Normal hearing thresholds      Speech Audiometry:   Right: Speech Reception Threshold (SRT) was obtained at 5 dB  HL  Word Recognition scores - Excellent (100)% at 45 dB, using NU-6 List 1A with 10 words  Left: Speech Reception Threshold (SRT) was obtained at 5 dB HL  Word Recognition scores - Excellent (100)% at 45 dB, using NU-6 List 1A with 10 words  SRT/PTA in good agreement bilaterally.     IMPRESSIONS:  Tympanometry showed normal middle ear pressure and static compliance, consistent with normal middle ear function for both ears. Pure tone thresholds for both ears show normal, suggesting normal outer/middle ear function and normal cochlear/retrocochlear function. Patient was counseled with regard to the findings.        Diagnosis:  1. Hearing within normal limits in both ears          RECOMMENDATIONS/PLAN:  Follow-up recommendations per CHARLY Weeks.  Practice good communication strategies to assist with everyday listening (eye contact with speakers, reduce background noise, encourage others to communicate clearly and slowly).  Consistent utilization of hearing protection devices in noisy environments.  Repeat hearing evaluation if changes in hearing are noted or concerns arise.  Discussed results and recommendations with patient. Questions were addressed and the patient was encouraged to contact our department should concerns arise.           Maritza Muhammad, CCC-A, F-AAA  Doctor of Audiology         History reviewed. No pertinent past medical history.    Past Surgical History:   Procedure Laterality Date    ADENOIDECTOMY      DILATATION AND CURETTAGE      EAR TUBES      TONSILLECTOMY AND ADENOIDECTOMY Bilateral 6/29/2023    Procedure: tonsillectomy with coblation;  Surgeon: Tyson Lerma MD;  Location: [facility];  Service: ENT;  Laterality: Bilateral;    TYMPANOPLASTY Left     WRIST SURGERY Right        No outpatient medications have been marked as taking for the 9/9/24 encounter (Office Visit) with Donnell Taylor APRN.       Patient has no known allergies.    Family History   Problem Relation Age  of Onset    Breast cancer Maternal Grandmother     Colon cancer Maternal Aunt        Social History     Socioeconomic History    Marital status: Single   Tobacco Use    Smoking status: Former     Current packs/day: 0.00     Types: Cigarettes     Quit date: 2012     Years since quittin.6    Smokeless tobacco: Never   Vaping Use    Vaping status: Never Used   Substance and Sexual Activity    Alcohol use: Yes     Comment: occ    Drug use: No    Sexual activity: Yes     Partners: Male     Birth control/protection: Implant     Comment: csp for 8 years       Review of Systems   Constitutional: Negative.    HENT:  Positive for sore throat.    Respiratory: Negative.         Vitals:    24 1306   BP: 160/82   Pulse: 69   Temp: 98.4 °F (36.9 °C)       Body mass index is 30.29 kg/m².    Objective     Physical Exam  Vitals reviewed.   Constitutional:       Appearance: Normal appearance. She is obese.   HENT:      Head: Normocephalic.      Right Ear: Tympanic membrane, ear canal and external ear normal.      Left Ear: Tympanic membrane, ear canal and external ear normal.      Nose: Nose normal.      Mouth/Throat:      Lips: Pink.      Mouth: Mucous membranes are moist.      Tonsils: 0 on the right. 0 on the left.      Comments: Cobblestone appearance to posterior op  Neurological:      Mental Status: She is alert.   Psychiatric:         Behavior: Behavior is cooperative.         Assessment & Plan   Diagnoses and all orders for this visit:    1. Granulation polyp of left middle ear (Primary)  Comments:  resolved    2. Laryngopharyngeal reflux (LPR)    3. Hearing within normal limits in both ears    Other orders  -     Omeprazole 20 MG tablet delayed-release; Take 20 mg by mouth 2 (Two) Times a Day for 30 days.  Dispense: 60 each; Refill: 3      * Surgery not found *  No orders of the defined types were placed in this encounter.      Patient to call if she experiences any additional ear problems  Omeprazole before  breakfast and dinner  Reflux precautions    Return in about 6 months (around 3/9/2025) for Recheck.       Patient Instructions   Gastroesophageal Reflux Disease (Laryngopharyngeal Reflux), Adult  Gastroesophageal reflux disease (GERD) and/or Laryngopharyngeal Reflux, (LPR) happens when acid from your stomach flows up into the esophagus and/or throat and voicebox or larynx. When acid comes in contact with the these organs, the acid can cause soreness (inflammation). Over time, GERD may create small holes (ulcers) in the lining of the esophagus and may lead to the development of hoarseness, difficulty swallowing,   feeling of something stuck in the throat, increased mucous or drainage and even predispose to the development of malignancies, (cancer).    CAUSES   Increased body weight. This puts pressure on the stomach, making acid rise from the stomach into the esophagus.  Smoking. This increases acid production in the stomach.  Drinking alcohol. This causes decreased pressure in the lower esophageal sphincter (valve or ring of muscle between the esophagus and stomach), allowing acid from the stomach into the esophagus.  Late evening meals and a full stomach. This increases pressure and acid production in the stomach.  A malformed lower esophageal sphincter  Diet which can include avoidance of gluten and dairy products  Age  SYMPTOMS   Burning pain in the lower part of the mid-chest behind the breastbone and in the mid-stomach area. This may occur twice a week or more often.  Trouble swallowing.  Sore throat.  Dry cough.  Asthma-like symptoms including chest tightness, shortness of breath, or wheezing.  Globus sensation-something stuck in the throat/fullness  Hoarseness  DIAGNOSIS   Your caregiver may be able to diagnose GERD based on your symptoms. In some cases, X-rays and other tests may be done to check for complications or to check the condition of your stomach and esophagus.  You may need to see another  doctor.  TREATMENT   Over-the-counter or prescription medicines to help decrease acid production.   Dietary and behavioral modifications or changes may be also recommended.  HOME CARE INSTRUCTIONS   Change the factors that you can control. Ask your caregiver for guidance concerning weight loss, quitting smoking, and alcohol consumption.  Avoid foods and drinks that make your symptoms worse, and MAY include such as:  Caffeine or alcoholic drinks.  Chocolate.  Gluten containing foods  Dairy  Peppermint or mint flavorings.  Garlic and onions.  Spicy foods.  Citrus fruits, such as oranges, neva, or limes.  Tomato-based foods such as sauce, chili, salsa, and pizza.  Fried and fatty foods.  Avoid lying down for the 3 hours prior to your bedtime or prior to taking a nap.  Eat small, frequent meals instead of large meals.  Wear loose-fitting clothing. Do not wear anything tight around your waist that causes pressure on your stomach.  Raise the head of your bed 6 to 8 inches with wood blocks to help you sleep. Extra pillows will not help.  Only take over-the-counter or prescription medicines for pain, discomfort, or fever as directed by your caregiver.  Do not take aspirin, ibuprofen, or other nonsteroidal anti-inflammatory drugs if possible (NSAIDs).  SEEK IMMEDIATE MEDICAL CARE IF:   You have pain in your arms, neck, jaw, teeth, or back.  Your pain increases or changes in intensity or duration.  You develop nausea, vomiting, or sweating (diaphoresis).  You develop shortness of breath, or you faint.  Your vomit is green, yellow, black, or looks like coffee grounds or blood.  Your stool is red, bloody, or black.  These symptoms could be signs of other problems, such as heart disease, gastric bleeding, or esophageal bleeding.  MAKE SURE YOU:   Understand these instructions.  Will watch your condition.  Will get help right away if you are not doing well or get worse.     This information is not intended to replace advice  given to you by your physician. Make sure you discuss any questions you have with your health care provider.     Modified by Renard Jiang MD, FACS 9/8/2016.  Document Released: 09/27/2006 Document Revised: 01/08/2016 Document Reviewed: 04/13/2016  Elsevier Interactive Patient Education ©2016 Elsevier Inc[employer]

## 2024-09-13 ENCOUNTER — TELEPHONE (OUTPATIENT)
Dept: OTOLARYNGOLOGY | Facility: CLINIC | Age: 29
End: 2024-09-13

## 2024-09-13 NOTE — TELEPHONE ENCOUNTER
[facility] received a fax that requires your attention. The document has been indexed to the patient’s chart for your review.      Reason for sending: PLEASE REVIEW PRIOR AUTH REQUEST FOR OMEPRAZOLE 20MG DR    Documents Description: PRIOR AUTH [id number]    Name of Sender: [employer]    Date Indexed: 9.13.24    Notes (if needed):

## 2024-10-29 NOTE — PROGRESS NOTES
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  She indicates at age 13 she had had a severe case of strep.  She indicates she had some testing at that time that indicated MRSA.  She was prescribed some oral and nasal topical antibiotic.  She indicates she never really completed the full course of treatment.  She describes having some problems with recurrent strep throat the day back to childhood.  She describes some ongoing problems with what sounds like recurrent pharyngitis.  She indicates her tonsils had always looked inflamed and red.  She indicates she is to get some whitish pockets on her tonsils as well.  Could not tell if she is describing more exudate or pharyngitis appearance or whether there may have been some concretions.  In any event she underwent tonsillectomy.  Her tonsils were removed in June 2023.  Periodically she developed some left-sided neck soreness.  She indicates she can almost point toward an area in the anterior left neck area just below the mandibular angle where she feels the soreness.  She indicates the symptoms will last 4 to 5 days and then show some improvement.  She gets about 4-5 episodes per every 6 months.  She had had some recent testing that demonstrated MRSA.  She was prescribed a course of Bactrim and some intranasal mupirocin.  She does not describe lesions to suggest cold sores.  She has noticed a small little lymph node in the anterior to the right ear.  She indicates she saw ENT 6 to 8 weeks ago when she had had a flare of her left-sided throat soreness.  She indicates they thought the appearance of the mucosa was somewhat cobblestone like and she indicates they thought she  may have some issues with reflux.  I got the impression talking to her that she may have had some blood work in the past that may have demonstrated HSV.  She was referred for evaluation and recommendations out of concerns that maybe is a staph carrier that would be the cause of her current symptoms.  She had had previous tympanostomy tube placement on the left.  She indicates the past she had had a repair of what sounds like an area of left tympanic membrane perforation.  She does not describe other head neck surgery with the exception of the tonsillectomy as outlined above.    Allergies: No Known Allergies    Current Scheduled Medications:   Current Outpatient Medications on File Prior to Visit   Medication Sig    fluticasone (FLONASE) 50 MCG/ACT nasal spray 2 sprays by Alternating Nares route Daily.    multivitamin with minerals tablet tablet Take 1 tablet by mouth Daily.    omeprazole (priLOSEC) 20 MG capsule Take 1 capsule by mouth Every 12 (Twelve) Hours. (Patient taking differently: Take 1 capsule by mouth Every 12 (Twelve) Hours As Needed.)    mupirocin (BACTROBAN) 2 % ointment Apply 1 Application topically to the appropriate area as directed 2 (Two) Times a Day. For 10 days (Patient not taking: Reported on 10/30/2024)    [DISCONTINUED] ciprofloxacin-dexAMETHasone (CIPRODEX) 0.3-0.1 % otic suspension Administer 3 drops into ear(s) as directed by provider 2 (Two) Times a Day. (Patient not taking: Reported on 10/30/2024)     No current facility-administered medications on file prior to visit.     Venous Access Review  Line/IV site: No current IV Access    Antimicrobial Review  Currently on antibiotics/antifungals: YES/NO: NO  Start Date of Therapy: 09- - 10-: cefprozil 500 MG PO daily                                        10- - 10-: Bactrim DS  If therapy completed, date complete: 10-      Past Medical History:   Diagnosis Date    Anemia during pregnancy     Eustachian tube  "disorder, left     GERD (gastroesophageal reflux disease)     History of vitamin D deficiency      Past Medical History Pertinent Negatives:   Diagnosis Date Noted    Hard to intubate 2023    Malignant hyperthermia due to anesthesia 2023    PONV (postoperative nausea and vomiting) 2023    Spinal headache 2023     Past Surgical History:   Procedure Laterality Date    DILATATION AND CURETTAGE      EAR TUBES Bilateral     TONSILLECTOMY AND ADENOIDECTOMY Bilateral 2023    Procedure: tonsillectomy with coblation;  Surgeon: Tyson Lerma MD;  Location: [facility];  Service: ENT;  Laterality: Bilateral;    TYMPANOPLASTY Left 2006    WRIST SURGERY Right      Family History   Problem Relation Age of Onset    Breast cancer Maternal Grandmother     Colon cancer Maternal Aunt      Social History     Socioeconomic History    Marital status: Single   Tobacco Use    Smoking status: Former     Current packs/day: 0.00     Types: Cigarettes     Quit date: 2012     Years since quittin.7     Passive exposure: Never    Smokeless tobacco: Never   Vaping Use    Vaping status: Never Used   Substance and Sexual Activity    Alcohol use: Yes     Comment: occ    Drug use: Defer    Sexual activity: Defer     Exposure History: No close contacts have been ill    Review of Systems See HPI.    Vital Signs:  /88   Pulse 84   Temp 98.1 °F (36.7 °C) (Temporal)   Ht 162.6 cm (64\")   Wt 83.6 kg (184 lb 6.4 oz)   LMP 10/18/2024 (Approximate)   SpO2 98%   BMI 31.65 kg/m²     Physical Exam  Vital signs - reviewed.  Currently exam of her oropharynx appears favorable  There were no oral ulcerations, thrush, or exudate  Tonsils have been removed  I could not find any areas of ulceration or erythema  There was no cervical lymphadenopathy  No jaw tenderness  There was a small few millimeter lymph node in the left preauricular area.  There was no fluctuance.    Lab/Imaging/Other Information: "   PROGRESS NOTES - SCAN - DAYTON GIRON, NP - [facility] - 10/7/24 (10/07/2024)     PROGRESS NOTES - SCAN - DAVID SEAN, APRN - [facility] - 9/22/24 (09/22/2024)     LABORATORY - SCAN - RPP/MOLECULAR PATHOGEN REPORT, NASAL SWAB - [facility] - 9/22/24 (09/22/2024)     Impression & Recommendations:   Diagnoses and all orders for this visit:    1. Pharyngitis, unspecified etiology (Primary)  -     HSV 1 Antibody, IgG; Future  -     HSV 2 Antibody, IgG; Future    She has had some problems with what sounds like recurrent pharyngitis.  The symptoms that seem to be ongoing are some left-sided neck discomfort in the left upper neck area.  Symptoms will last 4 to 5 days and then resolved.  I get the sense she does pretty well in the intervening periods.  I explained when I hear problems with recurrent symptoms that last in the range of a week and then resolve with recurrence I consider the possibility of HSV infection.  I explained recurrent HSV-1 or HSV-2 could cause some difficulties with recurrent ulcerations or recurrent pharyngitis type symptoms.  At the present time I do not find any exam evidence of pharyngitis.  I talked with her about a couple of different approaches.  First we could consider doing nothing but wait for a recurrence to develop and then try to get her examined by myself and also by ENT at that time.  Explained ENT could do an NP scope and look deep into the posterior inferior oropharynx area and obtain samples if any evidence of abnormality.  Explained we could also do serology for HSV 1 and HSV 2.  If either were positive we could consider putting her on suppression with Valtrex 1 g daily or acyclovir 400 mg orally twice daily for 6 months.  Typically she has 3-4 recurrences in that timeframe.  If it was successful in eradicating recurrences, that might be somewhat of a diagnostic test to see if she responds to suppression.  I also explained we could proceed with the HSV 1 and HSV  testing but not place her on suppression and again reevaluate her when symptoms recur.  Felt like she had a good understanding of risks and benefits of each approach.  We decided to go ahead and proceed with HSV-1 and HSV-2 serology.  She will call early next week for results.  If either is positive I will likely treat her with a trial of she would be interested in a trial of suppression.  At that point will prescribe Valtrex 1 g orally daily-1 month with 2 refills.  Follow-up appointment in 2 months.  Would like to see her sooner if any recurrent symptoms in the interim.    Follow Up:     There are no Patient Instructions on file for this visit.  No follow-ups on file.  Patient was provided After Visit Summary.     Stevo Morales MD    CC:Shawanda Grant

## 2024-10-30 ENCOUNTER — LAB (OUTPATIENT)
Dept: LAB | Facility: HOSPITAL | Age: 29
End: 2024-10-30
Payer: MEDICAID

## 2024-10-30 ENCOUNTER — OFFICE VISIT (OUTPATIENT)
Age: 29
End: 2024-10-30
Payer: MEDICAID

## 2024-10-30 VITALS
HEART RATE: 84 BPM | WEIGHT: 184.4 LBS | BODY MASS INDEX: 31.48 KG/M2 | TEMPERATURE: 98.1 F | OXYGEN SATURATION: 98 % | SYSTOLIC BLOOD PRESSURE: 125 MMHG | HEIGHT: 64 IN | DIASTOLIC BLOOD PRESSURE: 88 MMHG

## 2024-10-30 DIAGNOSIS — J02.9 PHARYNGITIS, UNSPECIFIED ETIOLOGY: Primary | ICD-10-CM

## 2024-10-30 DIAGNOSIS — J02.9 PHARYNGITIS, UNSPECIFIED ETIOLOGY: ICD-10-CM

## 2024-10-30 PROCEDURE — 36415 COLL VENOUS BLD VENIPUNCTURE: CPT

## 2024-10-30 PROCEDURE — 86695 HERPES SIMPLEX TYPE 1 TEST: CPT

## 2024-10-30 PROCEDURE — 86696 HERPES SIMPLEX TYPE 2 TEST: CPT

## 2024-10-30 RX ORDER — MULTIPLE VITAMINS W/ MINERALS TAB 9MG-400MCG
1 TAB ORAL DAILY
COMMUNITY

## 2024-10-30 RX ORDER — FLUTICASONE PROPIONATE 50 MCG
2 SPRAY, SUSPENSION (ML) NASAL DAILY
COMMUNITY

## 2024-10-30 RX ORDER — MUPIROCIN 20 MG/G
1 OINTMENT TOPICAL 2 TIMES DAILY
COMMUNITY
Start: 2024-10-07

## 2024-10-31 LAB
HSV1 IGG SERPL QL IA: REACTIVE
HSV2 IGG SERPL QL IA: NON REACTIVE

## 2024-11-01 ENCOUNTER — TELEPHONE (OUTPATIENT)
Age: 29
End: 2024-11-01
Payer: MEDICAID

## 2024-11-06 ENCOUNTER — PATIENT ROUNDING (BHMG ONLY) (OUTPATIENT)
Age: 29
End: 2024-11-06
Payer: MEDICAID

## 2024-11-06 NOTE — PROGRESS NOTES
November 6, 2024    Hello, may I speak with Robina Babin?    My name is Jazmine       I am  with [facility]  [facility] [facility] [address] [phone].    Before we get started may I verify your date of birth? 1995    I am calling to officially welcome you to our practice and ask about your recent visit. Is this a good time to talk? no    Tell me about your visit with us. What things went well?  Call went to voicemail and I left a message for a return call.        We're always looking for ways to make our patients' experiences even better. Do you have recommendations on ways we may improve?  no    Overall were you satisfied with your first visit to our practice? yes       I appreciate you taking the time to speak with me today. Is there anything else I can do for you? no      Thank you, and have a great day.

## 2024-11-12 ENCOUNTER — TELEPHONE (OUTPATIENT)
Age: 29
End: 2024-11-12
Payer: MEDICAID

## 2024-11-12 DIAGNOSIS — B00.9 HSV-1 INFECTION: ICD-10-CM

## 2024-11-12 DIAGNOSIS — J02.9 PHARYNGITIS, UNSPECIFIED ETIOLOGY: Primary | ICD-10-CM

## 2024-11-12 NOTE — TELEPHONE ENCOUNTER
Called and spoke with Robina to reschedule her visit  with Dr. Morales from December 30 to January 8.    She indicated that she has not heard back from anyone regarding her labs. Requests a call back.

## 2024-11-13 RX ORDER — VALACYCLOVIR HYDROCHLORIDE 1 G/1
1000 TABLET, FILM COATED ORAL DAILY
Qty: 30 TABLET | Refills: 2 | Status: SHIPPED | OUTPATIENT
Start: 2024-11-13 | End: 2025-02-11

## 2024-11-13 NOTE — TELEPHONE ENCOUNTER
I called patient and informed her of 10/30/24 lab results.  Per Dr. Morales's note, if either is positive, he was planning on prescribing a trial of Valtrex suppression.  She would like to proceed.  I confirmed her pharmacy.  She will be notified once prescription is ready.  I told her I will call her back if there is a problem.  She is aware of her follow-up in December.  She thanked me for calling.      Message sent to Dr. Morales:   [2:01 PM] Mai Carrillo ([facility])      Robina Babin  1995    28 year old female that you saw recently for suspected MRSA carrier/recurrent pharyngitis.      HSV 1 Antibody, IgG - Reactive  HSV 2 Antibody, IgG - Non-reactive    Your note said if either is positive, you were going to start her on Valtrex 1 gram PO daily, 1 month with 2 refills.      I just spoke with her and confirmed she would like to start Valtrex.  She has follow-up with you in December.  I have the prescription pended to send to her pharmacy once signed.  If a PA is required, the pharmacy should contact us.    I forgot to ask a question.  Is it OK for me to send Valtrex eRx to her pharmacy??      [2:08 PM] Stevo Morales ([facility])   Yes     [2:08 PM] Mai Carrillo ([facility])   thanks

## 2025-01-08 ENCOUNTER — OFFICE VISIT (OUTPATIENT)
Age: 30
End: 2025-01-08
Payer: MEDICAID

## 2025-01-08 VITALS
TEMPERATURE: 98.5 F | DIASTOLIC BLOOD PRESSURE: 78 MMHG | HEART RATE: 76 BPM | SYSTOLIC BLOOD PRESSURE: 126 MMHG | OXYGEN SATURATION: 99 % | HEIGHT: 64 IN | WEIGHT: 187.2 LBS | BODY MASS INDEX: 31.96 KG/M2

## 2025-01-08 DIAGNOSIS — B00.9 HSV-1 INFECTION: ICD-10-CM

## 2025-01-08 DIAGNOSIS — J02.9 PHARYNGITIS, UNSPECIFIED ETIOLOGY: Primary | ICD-10-CM

## 2025-01-08 PROCEDURE — 99213 OFFICE O/P EST LOW 20 MIN: CPT | Performed by: INTERNAL MEDICINE

## 2025-01-08 RX ORDER — VALACYCLOVIR HYDROCHLORIDE 1 G/1
1000 TABLET, FILM COATED ORAL DAILY
Qty: 30 TABLET | Refills: 3 | Status: SHIPPED | OUTPATIENT
Start: 2025-01-08 | End: 2025-04-08

## 2025-01-08 NOTE — PROGRESS NOTES
[facility] – [place]ious Diseases Progress Note    Patient:  Robina Babin  YOB: 1995  MRN: [id number]   Primary Care Physician: Shawanda Grant  Referring Physician: Dariela Rowland APRN     Chief Complaint: no sore throat since starting valacyclovir mid-November but reports she is really tired all the time  Long Lyme symptoms? She reports Lyme disease as a kid    Interval History/HPI: She returns today for follow-up of recurrent pharyngitis.  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  She has been on Valtrex since I last saw her the end of October.  She has had no recurrent pharyngitis symptoms subsequently.  We checked HSV-1 antibody and HSV-2 antibody at time of last appointment to see if HSV recurrences should remain on the differential diagnosis as a cause for her pharyngitis.  Her HSV-1 was positive.  Discussed those results with her.  Explained it does not explain with certainty recurrent HSV 1 as a cause, but it does maintain that on the list a possibility and the fact that she has had no recurrence is on Valtrex may be suggestive as well.  She indicates she feels tired.  That is not a new symptom.  That was present before and after her initiation of Valtrex treatment.    Allergies: No Known Allergies    Current Scheduled Medications:   Current Outpatient Medications on File Prior to Visit   Medication Sig    fluticasone (FLONASE) 50 MCG/ACT nasal spray 2 sprays by Alternating Nares route Daily. (Patient taking differently: 2 sprays by Alternating Nares route Daily As Needed.)    multivitamin with minerals tablet tablet Take 1 tablet by mouth Daily.    omeprazole (priLOSEC) 20 MG capsule Take 1 capsule by mouth Every 12 (Twelve) Hours. (Patient taking differently: Take 1 capsule by mouth Every 12 (Twelve) Hours As Needed.)    [DISCONTINUED] valACYclovir (Valtrex) 1000 MG tablet Take 1 tablet by mouth Daily for 90 days.    [DISCONTINUED] mupirocin (BACTROBAN) 2 % ointment Apply 1  "Application topically to the appropriate area as directed 2 (Two) Times a Day. For 10 days (Patient not taking: Reported on 1/8/2025)     No current facility-administered medications on file prior to visit.      Venous Access Review  Line/IV site: No current IV Access    Antimicrobial Review  Currently on antibiotics/antifungals: YES/NO: NO  Start Date of Therapy: 09- - 10-: cefprozil 500 MG PO daily                                        10- - 10-: Bactrim DS                                       11- - PRESENT: valacyclovir 1000 MG daily  If therapy completed, date complete: NA      Review of Systems See HPI.    Vital Signs:  /78 Comment: MANUAL left arm  Pulse 76   Temp 98.5 °F (36.9 °C) (Temporal)   Ht 162.6 cm (64\")   Wt 84.9 kg (187 lb 3.2 oz)   SpO2 99%   BMI 32.13 kg/m²     Physical Exam  Vital signs - reviewed.  Examination of the oropharynx shows moist normal-appearing mucosa.  No oral ulcerations, thrush, or erythema.    Lab/Imaging/Other Information:   HSV 2 Antibody, IgG (10/30/2024 12:41)   HSV 1 Antibody, IgG (10/30/2024 12:41)       Impression & Recommendations:   Diagnoses and all orders for this visit:    1. Pharyngitis, unspecified etiology (Primary)  -     valACYclovir (Valtrex) 1000 MG tablet; Take 1 tablet by mouth Daily for 90 days.  Dispense: 30 tablet; Refill: 3    2. HSV-1 infection  -     valACYclovir (Valtrex) 1000 MG tablet; Take 1 tablet by mouth Daily for 90 days.  Dispense: 30 tablet; Refill: 3    Recurrent pharyngitis without definite etiology, but suspecting the possibility of recurrent HSV 1.  Would suggest continuing Valtrex 1000 mg orally daily for an additional 3 to 4 months.  Would like to see her in follow-up at that time.  She should call in the interim if any recurrent symptoms in the meantime.  If we are successful in preventing recurrences with Valtrex treatment, will likely stop Valtrex at some point and then evaluate her with " physical exam and culture at the time of symptom recurrence.  She was comfortable with the plan we discussed.    Follow Up:   There are no Patient Instructions on file for this visit.  Return Please schedule follow-up appointment in 3 to 4 months.  CBL.  Patient was provided After Visit Summary.     Stevo Morales MD      CC:Shawanda Grant

## 2025-03-10 ENCOUNTER — TELEPHONE (OUTPATIENT)
Age: 30
End: 2025-03-10
Payer: MEDICAID

## 2025-03-10 NOTE — TELEPHONE ENCOUNTER
Patient called stating the valacyclovir is making her very dehydrated and she wants to stop taking it.  I asked if she would like to move up her April follow-up appt to discuss this with Dr. Morales.  She just wants to know if she should wean herself off valacyclovir, or can she just stop?      14:01 CDT  Patient notified of this information from Dr. Morales:   She can stop the acyclovir without tapering. I typically do not think this medication should cause dehydration but there is no problem with her stopping it if she would like to do so. Okay with me for her to move her follow-up appointment up to an earlier date.   She thanked me for calling.  She will leave her appt scheduled for April for now.

## 2025-04-16 ENCOUNTER — OFFICE VISIT (OUTPATIENT)
Age: 30
End: 2025-04-16
Payer: MEDICAID

## 2025-04-16 VITALS
SYSTOLIC BLOOD PRESSURE: 122 MMHG | HEIGHT: 66 IN | BODY MASS INDEX: 29.63 KG/M2 | DIASTOLIC BLOOD PRESSURE: 80 MMHG | HEART RATE: 74 BPM | TEMPERATURE: 98.3 F | WEIGHT: 184.4 LBS | OXYGEN SATURATION: 98 %

## 2025-04-16 DIAGNOSIS — B00.9 HSV-1 INFECTION: ICD-10-CM

## 2025-04-16 DIAGNOSIS — J02.9 PHARYNGITIS, UNSPECIFIED ETIOLOGY: Primary | ICD-10-CM

## 2025-04-16 PROCEDURE — 1160F RVW MEDS BY RX/DR IN RCRD: CPT | Performed by: INTERNAL MEDICINE

## 2025-04-16 PROCEDURE — 1159F MED LIST DOCD IN RCRD: CPT | Performed by: INTERNAL MEDICINE

## 2025-04-16 PROCEDURE — 99214 OFFICE O/P EST MOD 30 MIN: CPT | Performed by: INTERNAL MEDICINE

## 2025-04-16 NOTE — PROGRESS NOTES
[facility] [place]    Patient:  Robina Babin  YOB: 1995  MRN: [id number]   Primary Care Physician: Shawanda Grant  Referring Physician: Dariela Rowland APRN     Chief Complaint: no complaints    Interval History/HPI: recurrent pharyngitis     She returns today for follow-up.  She had had problems with recurrent pharyngitis.  She had been on Valtrex for prophylaxis.  She indicates the Valtrex gave her a very dry mouth and made her feel dehydrated.  She tried taking before going to bed but could not sleep.  She had COVID in late February.  It sounds like she had maybe a brief area of oral soreness at that time.  Otherwise she has not had any symptoms of recurrent pharyngitis.  We discussed her HSV 1 and 2 antibody testing.  Her HSV-1 IgG was positive indicating recurrent pharyngitis symptoms could potentially be related to HSV.  Her HSV-2 antibody testing was negative indicating no prior history of HSV-2.    Allergies: No Known Allergies  Current Scheduled Medications:   Current Outpatient Medications on File Prior to Visit   Medication Sig    fluticasone (FLONASE) 50 MCG/ACT nasal spray 2 sprays by Alternating Nares route Daily. (Patient taking differently: 2 sprays by Alternating Nares route Daily As Needed.)    multivitamin with minerals tablet tablet Take 1 tablet by mouth Daily.    omeprazole (priLOSEC) 20 MG capsule Take 1 capsule by mouth Every 12 (Twelve) Hours. (Patient taking differently: Take 1 capsule by mouth Every 12 (Twelve) Hours As Needed.)     No current facility-administered medications on file prior to visit.      Venous Access Review  Line/IV site: No current IV Access  Antimicrobial Review  Currently on antibiotics/antifungals: YES/NO: NO  Start Date of Therapy: 09- - 10-: cefprozil 500 MG PO daily                                        10- - 10-: Bactrim DS                                       11- - PRESENT: valacyclovir  "1000 MG daily   If therapy completed, date complete: 3/10/2025    Review of Systems See HPI.    Vital Signs:  /80 (BP Location: Left arm, Patient Position: Sitting, Cuff Size: Adult)   Pulse 74   Temp 98.3 °F (36.8 °C) (Oral)   Ht 167.6 cm (66\")   Wt 83.6 kg (184 lb 6.4 oz)   SpO2 98%   BMI 29.76 kg/m²     Physical Exam  Vital signs - reviewed.  Current oropharyngeal exam without any ulcerations, thrush, or exudate.  No cervical lymphadenopathy    Lab/Imaging/Other Information:   HSV 2 Antibody, IgG (10/30/2024 12:41)   HSV 1 Antibody, IgG (10/30/2024 12:41)    Impression & Recommendations:   Diagnoses and all orders for this visit:    1. Pharyngitis, unspecified etiology (Primary)    2. HSV-1 infection    I suspect at least some of the prior recurrent pharyngitis symptoms she was experiencing were related to HSV 1.  She does not tolerate Valtrex 1 g daily for prophylaxis very well.  We talked about trying a different suppression such as Valtrex 500 mg orally daily or acyclovir 40 mg every 12 hours.  At present she feels comfortable monitoring for any recurrent symptoms off antiviral treatment.    If she were to get recurrent symptoms/findings consistent with HSV activation she could take the following:  Valtrex 1 g every 12 hours for 7 days  Or  Acyclovir 400 mg orally every 8 hours for 7 days    If she were to experience frequent recurrences and wanted a trial of suppression she could try the following:  Valtrex 500 mg orally daily or repeat a trial of Valtrex 1000 mg orally daily  Or  Acyclovir 400 mg orally twice daily    Offered ongoing or as needed follow-up.  She plans to follow-up with her primary care clinicians at [facility].  I would be happy to reassess at any point if additional questions or concerns arise.  She otherwise can follow-up with infectious diseases as needed.  She was comfortable with the plan we discussed.    Follow Up:   There are no Patient Instructions on file " for this visit.  Return for She is planning to follow-up with infectious diseases as needed.  CBL.  Patient was provided After Visit Summary.     Stevo Morales MD    CC: CHARLY Amin